# Patient Record
Sex: MALE | Race: ASIAN | URBAN - METROPOLITAN AREA
[De-identification: names, ages, dates, MRNs, and addresses within clinical notes are randomized per-mention and may not be internally consistent; named-entity substitution may affect disease eponyms.]

---

## 2019-04-28 ENCOUNTER — EMERGENCY (EMERGENCY)
Facility: HOSPITAL | Age: 69
LOS: 1 days | Discharge: HOME | End: 2019-04-28
Attending: EMERGENCY MEDICINE | Admitting: EMERGENCY MEDICINE
Payer: COMMERCIAL

## 2019-04-28 VITALS
OXYGEN SATURATION: 98 % | TEMPERATURE: 98 F | RESPIRATION RATE: 18 BRPM | HEART RATE: 78 BPM | DIASTOLIC BLOOD PRESSURE: 114 MMHG | SYSTOLIC BLOOD PRESSURE: 185 MMHG

## 2019-04-28 LAB
ALBUMIN SERPL ELPH-MCNC: 4.3 G/DL — SIGNIFICANT CHANGE UP (ref 3.3–5)
ALP SERPL-CCNC: 83 U/L — SIGNIFICANT CHANGE UP (ref 40–120)
ALT FLD-CCNC: 20 U/L — SIGNIFICANT CHANGE UP (ref 4–41)
ANION GAP SERPL CALC-SCNC: 16 MMO/L — HIGH (ref 7–14)
APTT BLD: 35.8 SEC — SIGNIFICANT CHANGE UP (ref 27.5–36.3)
AST SERPL-CCNC: 18 U/L — SIGNIFICANT CHANGE UP (ref 4–40)
BASE EXCESS BLDV CALC-SCNC: 4.1 MMOL/L — SIGNIFICANT CHANGE UP
BASOPHILS # BLD AUTO: 0.02 K/UL — SIGNIFICANT CHANGE UP (ref 0–0.2)
BASOPHILS NFR BLD AUTO: 0.2 % — SIGNIFICANT CHANGE UP (ref 0–2)
BILIRUB SERPL-MCNC: 1 MG/DL — SIGNIFICANT CHANGE UP (ref 0.2–1.2)
BLD GP AB SCN SERPL QL: NEGATIVE — SIGNIFICANT CHANGE UP
BLOOD GAS VENOUS - CREATININE: 1.54 MG/DL — HIGH (ref 0.5–1.3)
BUN SERPL-MCNC: 20 MG/DL — SIGNIFICANT CHANGE UP (ref 7–23)
CALCIUM SERPL-MCNC: 9.7 MG/DL — SIGNIFICANT CHANGE UP (ref 8.4–10.5)
CHLORIDE BLDV-SCNC: 109 MMOL/L — HIGH (ref 96–108)
CHLORIDE SERPL-SCNC: 104 MMOL/L — SIGNIFICANT CHANGE UP (ref 98–107)
CO2 SERPL-SCNC: 23 MMOL/L — SIGNIFICANT CHANGE UP (ref 22–31)
CREAT SERPL-MCNC: 1.65 MG/DL — HIGH (ref 0.5–1.3)
EOSINOPHIL # BLD AUTO: 0.07 K/UL — SIGNIFICANT CHANGE UP (ref 0–0.5)
EOSINOPHIL NFR BLD AUTO: 0.8 % — SIGNIFICANT CHANGE UP (ref 0–6)
GAS PNL BLDV: 137 MMOL/L — SIGNIFICANT CHANGE UP (ref 136–146)
GLUCOSE BLDV-MCNC: 144 — HIGH (ref 70–99)
GLUCOSE SERPL-MCNC: 147 MG/DL — HIGH (ref 70–99)
HCO3 BLDV-SCNC: 26 MMOL/L — SIGNIFICANT CHANGE UP (ref 20–27)
HCT VFR BLD CALC: 48.5 % — SIGNIFICANT CHANGE UP (ref 39–50)
HCT VFR BLDV CALC: 49.5 % — SIGNIFICANT CHANGE UP (ref 39–51)
HGB BLD-MCNC: 15.8 G/DL — SIGNIFICANT CHANGE UP (ref 13–17)
HGB BLDV-MCNC: 16.2 G/DL — SIGNIFICANT CHANGE UP (ref 13–17)
IMM GRANULOCYTES NFR BLD AUTO: 0.5 % — SIGNIFICANT CHANGE UP (ref 0–1.5)
INR BLD: 0.99 — SIGNIFICANT CHANGE UP (ref 0.88–1.17)
LACTATE BLDV-MCNC: 3.1 MMOL/L — HIGH (ref 0.5–2)
LYMPHOCYTES # BLD AUTO: 2.49 K/UL — SIGNIFICANT CHANGE UP (ref 1–3.3)
LYMPHOCYTES # BLD AUTO: 26.9 % — SIGNIFICANT CHANGE UP (ref 13–44)
MCHC RBC-ENTMCNC: 28.9 PG — SIGNIFICANT CHANGE UP (ref 27–34)
MCHC RBC-ENTMCNC: 32.6 % — SIGNIFICANT CHANGE UP (ref 32–36)
MCV RBC AUTO: 88.8 FL — SIGNIFICANT CHANGE UP (ref 80–100)
MONOCYTES # BLD AUTO: 0.48 K/UL — SIGNIFICANT CHANGE UP (ref 0–0.9)
MONOCYTES NFR BLD AUTO: 5.2 % — SIGNIFICANT CHANGE UP (ref 2–14)
NEUTROPHILS # BLD AUTO: 6.16 K/UL — SIGNIFICANT CHANGE UP (ref 1.8–7.4)
NEUTROPHILS NFR BLD AUTO: 66.4 % — SIGNIFICANT CHANGE UP (ref 43–77)
NRBC # FLD: 0 K/UL — SIGNIFICANT CHANGE UP (ref 0–0)
PCO2 BLDV: 55 MMHG — HIGH (ref 41–51)
PH BLDV: 7.35 PH — SIGNIFICANT CHANGE UP (ref 7.32–7.43)
PLATELET # BLD AUTO: 223 K/UL — SIGNIFICANT CHANGE UP (ref 150–400)
PMV BLD: 10.4 FL — SIGNIFICANT CHANGE UP (ref 7–13)
PO2 BLDV: 39 MMHG — SIGNIFICANT CHANGE UP (ref 35–40)
POTASSIUM BLDV-SCNC: 8.1 MMOL/L — CRITICAL HIGH (ref 3.4–4.5)
POTASSIUM SERPL-MCNC: 4.1 MMOL/L — SIGNIFICANT CHANGE UP (ref 3.5–5.3)
POTASSIUM SERPL-SCNC: 4.1 MMOL/L — SIGNIFICANT CHANGE UP (ref 3.5–5.3)
PROT SERPL-MCNC: 7.8 G/DL — SIGNIFICANT CHANGE UP (ref 6–8.3)
PROTHROM AB SERPL-ACNC: 11.3 SEC — SIGNIFICANT CHANGE UP (ref 9.8–13.1)
RBC # BLD: 5.46 M/UL — SIGNIFICANT CHANGE UP (ref 4.2–5.8)
RBC # FLD: 12.9 % — SIGNIFICANT CHANGE UP (ref 10.3–14.5)
RH IG SCN BLD-IMP: POSITIVE — SIGNIFICANT CHANGE UP
SAO2 % BLDV: 67.5 % — SIGNIFICANT CHANGE UP (ref 60–85)
SODIUM SERPL-SCNC: 143 MMOL/L — SIGNIFICANT CHANGE UP (ref 135–145)
WBC # BLD: 9.27 K/UL — SIGNIFICANT CHANGE UP (ref 3.8–10.5)
WBC # FLD AUTO: 9.27 K/UL — SIGNIFICANT CHANGE UP (ref 3.8–10.5)

## 2019-04-28 PROCEDURE — 99218: CPT | Mod: GC

## 2019-04-28 PROCEDURE — 70544 MR ANGIOGRAPHY HEAD W/O DYE: CPT | Mod: 26,59

## 2019-04-28 PROCEDURE — 70450 CT HEAD/BRAIN W/O DYE: CPT | Mod: 26

## 2019-04-28 PROCEDURE — 70549 MR ANGIOGRAPH NECK W/O&W/DYE: CPT | Mod: 26

## 2019-04-28 PROCEDURE — 70553 MRI BRAIN STEM W/O & W/DYE: CPT | Mod: 26

## 2019-04-28 RX ORDER — AMLODIPINE BESYLATE 2.5 MG/1
5 TABLET ORAL DAILY
Qty: 0 | Refills: 0 | Status: DISCONTINUED | OUTPATIENT
Start: 2019-04-28 | End: 2019-05-02

## 2019-04-28 RX ORDER — SODIUM CHLORIDE 9 MG/ML
500 INJECTION INTRAMUSCULAR; INTRAVENOUS; SUBCUTANEOUS ONCE
Qty: 0 | Refills: 0 | Status: COMPLETED | OUTPATIENT
Start: 2019-04-28 | End: 2019-04-29

## 2019-04-28 RX ORDER — ASPIRIN/CALCIUM CARB/MAGNESIUM 324 MG
81 TABLET ORAL ONCE
Qty: 0 | Refills: 0 | Status: COMPLETED | OUTPATIENT
Start: 2019-04-28 | End: 2019-04-28

## 2019-04-28 RX ORDER — ATORVASTATIN CALCIUM 80 MG/1
80 TABLET, FILM COATED ORAL ONCE
Qty: 0 | Refills: 0 | Status: COMPLETED | OUTPATIENT
Start: 2019-04-28 | End: 2019-04-28

## 2019-04-28 RX ORDER — LABETALOL HCL 100 MG
10 TABLET ORAL ONCE
Qty: 0 | Refills: 0 | Status: COMPLETED | OUTPATIENT
Start: 2019-04-28 | End: 2019-04-28

## 2019-04-28 RX ORDER — SODIUM CHLORIDE 9 MG/ML
1000 INJECTION INTRAMUSCULAR; INTRAVENOUS; SUBCUTANEOUS ONCE
Qty: 0 | Refills: 0 | Status: COMPLETED | OUTPATIENT
Start: 2019-04-28 | End: 2019-04-28

## 2019-04-28 RX ADMIN — AMLODIPINE BESYLATE 5 MILLIGRAM(S): 2.5 TABLET ORAL at 22:41

## 2019-04-28 RX ADMIN — SODIUM CHLORIDE 1000 MILLILITER(S): 9 INJECTION INTRAMUSCULAR; INTRAVENOUS; SUBCUTANEOUS at 19:59

## 2019-04-28 RX ADMIN — Medication 81 MILLIGRAM(S): at 19:59

## 2019-04-28 RX ADMIN — Medication 10 MILLIGRAM(S): at 18:46

## 2019-04-28 RX ADMIN — ATORVASTATIN CALCIUM 80 MILLIGRAM(S): 80 TABLET, FILM COATED ORAL at 19:59

## 2019-04-28 NOTE — ED PROVIDER NOTE - ATTENDING CONTRIBUTION TO CARE
Attending Attestation: Dr. Galvez  I have personally performed a history and physical examination of the patient and discussed management with the resident as well as the patient.  I reviewed the resident's note and agree with the documented findings and plan of care.  I have authored and modified critical sections of the Provider Note, including but not limited to HPI, Physical Exam and MDM. 68M w/ hx of HTN, CKD presents as a code stroke for dizziness.  Though sx started approx 1 wk ago have been stuttering.  Possible CVA? No neuro deficits at present.  Will obtain neuro eval, CTH. Duration of symptoms and low NIHSS preclude tPA (risks > benefits). Pt is also noted to be HTN to 190s systolic which could be contributing to his symptoms. will attempt bp control, send basic labs. will likely need obs for mri (r/o CVA, vert/carotid dissection).  Given elevated Cr ( baseline 1.3) and extended duration of sx will defer CTA for now pending MRI/MRA)

## 2019-04-28 NOTE — ED ADULT NURSE NOTE - OBJECTIVE STATEMENT
68M PMH  of HTN, CKD presents as a code stroke for LUE and LLE paresthesias and generalized weakness. The patient endorses intermittent dizziness and weakness for approx one week, was on meclazine which slightly improved symptoms. Dizziness was worsened by heavy lifting and bending over. Symptoms got better for a few days and then worsened after bending over and standing up today. Denies chest pain, sob, ha, blurry vision.  Pt AOx3, hypertensive upon arrival. + generalized weakness. No focal neuro deficits. Speech clear. Gait steady.

## 2019-04-28 NOTE — CONSULT NOTE ADULT - SUBJECTIVE AND OBJECTIVE BOX
*************************************  NEUROLOGY CONSULT  NOTE  **************************************    ALEXANDER YOUSSEF  Male  MRN-[id number]    HPI:  69 yo man with Hx of HTN, CKD I presents from home with chief complaint of L sided weakness. Symptoms onset/LNW is 2:30 PM today 4/28 while at grocery store. Pt also states he felt weak all over in addition to L symptoms so sat down. Daughter is urology resident at [facility] who is providing collateral. States she observed her at home and denies seeing any facial droop or signs of unilateral weakness. On arrival to ER BP elevated to 198 systolic; appears in NAD. Exam significant for mild diminished sensation to LT on Left side of body otherwise non-focal including romberg's and gait. Per daughter 2 weeks ago pt had complained of dizziness and PCP Dx with vertigo. which was also associated with diaphoresis. Denies prior similar sx or hx of stroke. CTH w/o acute findings. Pt is excluded from tPA after discussing risks vs. benefits with daughter/pt and given mild non deficits who voiced understanding and agreement. NIHSS =1, MRS =0    ROS: Patient denies trauma, LOC, fever, chills, HA, vision changes, tinnitus, dysarthria, dysphagia, dizziness, chest pain, palpitations, SOB, nausea, vomiting, diarrhea, dysuria and incontinence.    PAST MEDICAL & SURGICAL HISTORY:    MEDICATIONS  (STANDING):  labetalol Injectable 10 milliGRAM(s) IV Push once    MEDICATIONS  (PRN):  Allergies  No Known Allergies  Intolerances      VITAL SIGNS:  Vital Signs Last 24 Hrs  T(C): 36.9 (28 Apr 2019 17:38), Max: 36.9 (28 Apr 2019 17:38)  T(F): 98.4 (28 Apr 2019 17:38), Max: 98.4 (28 Apr 2019 17:38)  HR: 78 (28 Apr 2019 17:38) (78 - 78)  BP: 185/114 (28 Apr 2019 17:38) (185/114 - 185/114)  BP(mean): --  RR: 18 (28 Apr 2019 17:38) (18 - 18)  SpO2: 98% (28 Apr 2019 17:38) (98% - 98%)    PHYSICAL EXAMINATION:  General: Well-developed, well nourished, in no acute distress.  Eyes: Conjunctiva and sclera clear.  Neck: Supple, nontender  Skin: no rash, no edema noted  Lung: no respiratory distress noted  Neurologic:  - Mental Status:  Alert, awake, oriented to person, place, and time; Speech is fluent with intact naming, repetition, and comprehension; crosses midline, no extinction or neglect noted  - Cranial Nerves II-XII:  VFF, No nystagmus or APD noted, EOMI, PERRLA, V1-V3 intact, no facial asymmetry, t/p midline, SCM/trap intact.  - Motor:  Strength is 5/5 throughout.  There is no pronator drift.  Normal muscle bulk and tone throughout. No myoclonus or tremor.  - Reflexes:  2+ and symmetric at the biceps, triceps, brachioradialis, knees, and ankles.  Plantar responses flexor.  - Sensory:  diminished to LT on left including lower face  - Coordination:  Finger-nose-finger without dysmetria.  Rapid alternating hand movements intact. No orbiting  - Gait:   Normal steps, base, arm swing, and turning. Tandem gait is normal.  Romberg testing is negative.    LABS:                          15.8   9.27  )-----------( 223      ( 28 Apr 2019 17:57 )             48.5           RADIOLOGY & ADDITIONAL STUDIES:      < from: CT Brain Stroke Protocol (04.28.19 @ 18:02) >  IMPRESSION:    No acute intracranial hemorrhage, mass effect, or CT evidence of an acute   vascular territorialinfarct.    Minimal chronic ischemic changes are seen in the frontoparietal white   matter.    < end of copied text >

## 2019-04-28 NOTE — ED PROVIDER NOTE - OBJECTIVE STATEMENT
68M w/ hx of HTN, CKD 68M w/ hx of HTN, CKD presents as a code stroke for LUE and LLE paresthesias and generalized weakness. The patient endorses intermittent dizziness for approx one week, was on meclazine which slightly improved symptoms. Dizziness was worsened by heavy lifting and bending over. Symptoms got better for a few days and then worsened after bending over and standing up today. Denies chest pain, sob, ha, blurry vision. 68M w/ hx of HTN, CKD presents as a code stroke d/t intermittent dizziness, latest starting approx 1 hr ago. The patient endorses intermittent dizziness for approx one week, was on meclizine via PMD which slightly improved symptoms. Dizziness was worsened by heavy lifting and bending over. Symptoms got better for a few days and then worsened after bending over and standing up today. Denies chest pain, sob, ha, blurry vision.  Also endorses general weakness.  NO fever/chills. Pt's daughter, a urology resident, is at bedside.

## 2019-04-28 NOTE — ED ADULT TRIAGE NOTE - CHIEF COMPLAINT QUOTE
Patient reports left-sided numbness/tingling/weakness since 1430. Patient reports that symptoms resolved after 30 minutes, and then came back. Patient currently has no facial droop, no unilateral weakness, + sensation. MD Galvez performed stroke evaluation. SUZI AGUAYO.

## 2019-04-28 NOTE — CONSULT NOTE ADULT - ASSESSMENT
67 yo man with Hx of HTN, CKD I presents from home with chief complaint of L sided weakness. Symptoms onset/LNW is 2:30 PM today 4/28 while at grocery store. Exam significant for mild left hemisensory deficit.     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  **** Please have pt follow up outpatient with Stroke NP Lorri Liao within 1 week of DC at [facility] [place] located at  [address] [place] [address], [address] [place]; Ph# [phone] and/or email PHI to [email or web address]    - Patient's and daughter's questions and concerns addressed. Pt voiced understanding.    case d/w stroke fellow Dr. Velasco    neuro spectra [id number] (please inform if d/cing) 69 yo man with Hx of HTN, CKD I presents from home with chief complaint of L sided weakness. Symptoms onset/LNW is 2:30 PM today 4/28 while at grocery store. Exam significant for mild left hemisensory deficit.     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   []  ASA81/Hdummr82 for 3 weeks followed by ASA 81 alone per CHANCE trial  []  Atorvastatin 80, titrate to LDL<70  []  MRI brain w/o, TTE (maybe done outpatient if being discharged)    []  Please send HbA1C and Lipid Panel  []  Telemonitoring;  Neurochecks per unit protocol  []  NPO until clears Dysphagia screen, otherwise Swallow evaluation  []  Stroke education provided    Once optimized from ED POV, pt should follow up with stroke neurology outpt.  **** Please have pt follow up outpatient with Stroke NP Lorri Liao within 1 week of DC at [facility] [place] located at  [address] [place] [address], [address] [place]; Ph# [phone] and/or email PHI to [email or web address]    - Patient's and daughter's questions and concerns addressed. Pt voiced understanding.    case d/w stroke fellow Dr. Velasco    neuro spectra [id number] (please inform if d/cing) 67 yo man with Hx of HTN, CKD I presents from home with chief complaint of L sided weakness. Symptoms onset/LNW is 2:30 PM today 4/28 while at grocery store. Exam significant for mild left hemisensory deficit.     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   []  ASA81/Smgsmi16 for 3 weeks followed by ASA 81 alone per CHANCE trial  []  Atorvastatin 80, titrate to LDL<70  []  MRI brain w/o, TTE (maybe done outpatient if being discharged)    []  Please send HbA1C and Lipid Panel  []  Telemonitoring;  Neurochecks per unit protocol  []  NPO until clears Dysphagia screen, otherwise Swallow evaluation  []  Stroke education provided    Once optimized from ED POV, pt should follow up with stroke neurology outpt.    **** Please have pt follow up outpatient with Stroke NP Lorri Liao within 1 week of DC at [facility] [place] located at  [address] [place] [address], [address] [place]; Ph# [phone] and/or email PHI to [email or web address]    - Patient's and daughter's questions and concerns addressed. Pt voiced understanding.    case d/w stroke fellow Dr. Velasco    neuro spectra [id number] (please inform if d/cing) 67 yo man with Hx of HTN, CKD I presents from home with chief complaint of L sided weakness. Symptoms onset/LNW is 2:30 PM today 4/28 while at grocery store. Exam significant for mild left hemisensory deficit. Daughter also later reported mild vague R neck pain that pt has been endorsing for several days.    *** 7PM interval re-assessment: pt reports complete resolution of his left sided paresthesia reports feeling generalized weakness only.    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    **** Please have pt follow up outpatient with Stroke NP Lorri Liao within 1 week of DC at [facility] [place] located at  [address] [place] [address], [address] [place]; Ph# [phone] and/or email PHI to [email or web address]    - Patient's and daughter's questions and concerns addressed f/u numbers provided. Pt voiced understanding.    case d/w stroke fellow Dr. Velasco    neuro spectra [id number] (please inform if d/cing)

## 2019-04-28 NOTE — ED PROVIDER NOTE - PHYSICAL EXAMINATION
General: WN/WD NAD  HEENT: PERRLA, EOMI, moist mucous membranes  Neurology: A&Ox3, decreased sensation in the LUE and LLE, strength in tact, WEBSTER x 4, rhomberg neg. gait wnl, no pronator drift  Respiratory: CTA B/L, normal respiratory effort, no wheezes, crackles, rales  CV: RRR, S1S2, no murmurs, rubs or gallops  Abdominal: Soft, NT, ND +BS  Extremities: No edema, + peripheral pulses  Incisions: none  Tubes: piv

## 2019-04-28 NOTE — ED PROVIDER NOTE - NS ED ROS FT
REVIEW OF SYSTEMS:    Constitutional:     [ ] negative [ -] fevers [ -] chills [ ] weight loss [ ] weight gain  HEENT:                  [ ] negative [ ] dry eyes [ ] eye irritation [ ] postnasal drip [ ] nasal congestion  CV:                         [ ] negative  [ -] chest pain [ ] orthopnea [ ] palpitations [ ] murmur  Resp:                     [ ] negative [- ] cough [ ] shortness of breath [ ] dyspnea [ ] wheezing [ ] sputum [ ] hemoptysis  GI:                          [ ] negative [- ] nausea [ -] vomiting [ ] diarrhea [ ] constipation [ ] abd pain [ ] dysphagia   :                        [ ] negative [- ] dysuria [ ] nocturia [ ] hematuria [ ] increased urinary frequency  Musculoskeletal: [ ] negative [ -] back pain [ ] myalgias [ ] arthralgias [ ] fracture  Skin:                       [ ] negative [ -] rash [ ] itch  Neurological:        [ ] negative [ ] headache [ ] dizziness [ ] syncope [ ] weakness [ ] numbness  Psychiatric:           [ ] negative [ -] anxiety [ ] depression  Endocrine:            [ ] negative [-] diabetes [ ] thyroid problem  Heme/Lymph:      [ ] negative [ ] anemia [ ] bleeding problem  Allergic/Immune: [ ] negative [ ] itchy eyes [ ] nasal discharge [ ] hives [ ] angioedema    [x ] All other systems negative  [ ] Unable to assess ROS because ________.

## 2019-04-28 NOTE — ED PROVIDER NOTE - CLINICAL SUMMARY MEDICAL DECISION MAKING FREE TEXT BOX
68M w/ hx of HTN, CKD presents as a code stroke for LUE and LLE paresthesias and generalized weakness. Duration of symptoms precludes tPA. Pt is also noted to be HTN to 190s systolic which could be contributing to his sypmtoms. will attempt bp control, send basic labs. will likely need obs for mri. 68M w/ hx of HTN, CKD presents as a code stroke for dizziness.  Though sx started approx 1 wk ago have been stuttering.  Possible CVA? No neuro deficits at present.  Will obtain neuro eval, CTH. Duration of symptoms and low NIHSS preclude tPA (risks > benefits). Pt is also noted to be HTN to 190s systolic which could be contributing to his symptoms. will attempt bp control, send basic labs. will likely need obs for mri (r/o CVA, vert/carotid dissection).  Given elevated Cr ( baseline 1.3) and extended duration of sx will defer CTA for now pending MRI/MRA)

## 2019-04-28 NOTE — ED CDU PROVIDER INITIAL DAY NOTE - MEDICAL DECISION MAKING DETAILS
A:  -Dizziness  P:  -MR Head, MR Angio Neck Pt p/w dizziness, intermittent x 5 days, worse today.  Code stroke in ED.  To CDU for MRI/MRA to assess CVA and also vert/carotid dissxn. Currently feeling somewhat better.  BP controlled.

## 2019-04-28 NOTE — ED CDU PROVIDER INITIAL DAY NOTE - ATTENDING CONTRIBUTION TO CARE
ED Attending (Dr Galvez): I have personally performed a face to face bedside history and physical examination of this patient.  I have discussed the case with the PA and  I have personally authored the following components: HPI, ROS, Physical Exam and MDM in addition to reviewing and revising the rest of the Provider Note. Pt p/w dizziness, intermittent x 5 days, worse today.  Code stroke in ED.  To CDU for MRI/MRA to assess CVA and also vert/carotid dissxn. Currently feeling somewhat better.  BP controlled.

## 2019-04-29 VITALS
TEMPERATURE: 97 F | HEART RATE: 58 BPM | RESPIRATION RATE: 17 BRPM | DIASTOLIC BLOOD PRESSURE: 99 MMHG | OXYGEN SATURATION: 100 % | SYSTOLIC BLOOD PRESSURE: 146 MMHG

## 2019-04-29 LAB
ALBUMIN SERPL ELPH-MCNC: 3.7 G/DL — SIGNIFICANT CHANGE UP (ref 3.3–5)
ALP SERPL-CCNC: 67 U/L — SIGNIFICANT CHANGE UP (ref 40–120)
ALT FLD-CCNC: 14 U/L — SIGNIFICANT CHANGE UP (ref 4–41)
ANION GAP SERPL CALC-SCNC: 11 MMO/L — SIGNIFICANT CHANGE UP (ref 7–14)
AST SERPL-CCNC: 15 U/L — SIGNIFICANT CHANGE UP (ref 4–40)
BASE EXCESS BLDV CALC-SCNC: 3 MMOL/L — SIGNIFICANT CHANGE UP
BILIRUB SERPL-MCNC: 1.3 MG/DL — HIGH (ref 0.2–1.2)
BLOOD GAS VENOUS - CREATININE: 1.21 MG/DL — SIGNIFICANT CHANGE UP (ref 0.5–1.3)
BUN SERPL-MCNC: 16 MG/DL — SIGNIFICANT CHANGE UP (ref 7–23)
CALCIUM SERPL-MCNC: 8.7 MG/DL — SIGNIFICANT CHANGE UP (ref 8.4–10.5)
CHLORIDE BLDV-SCNC: 113 MMOL/L — HIGH (ref 96–108)
CHLORIDE SERPL-SCNC: 110 MMOL/L — HIGH (ref 98–107)
CHOLEST SERPL-MCNC: 163 MG/DL — SIGNIFICANT CHANGE UP (ref 120–199)
CO2 SERPL-SCNC: 25 MMOL/L — SIGNIFICANT CHANGE UP (ref 22–31)
CREAT SERPL-MCNC: 1.34 MG/DL — HIGH (ref 0.5–1.3)
GAS PNL BLDV: 141 MMOL/L — SIGNIFICANT CHANGE UP (ref 136–146)
GLUCOSE BLDV-MCNC: 96 — SIGNIFICANT CHANGE UP (ref 70–99)
GLUCOSE SERPL-MCNC: 101 MG/DL — HIGH (ref 70–99)
HCO3 BLDV-SCNC: 26 MMOL/L — SIGNIFICANT CHANGE UP (ref 20–27)
HCT VFR BLDV CALC: 45.4 % — SIGNIFICANT CHANGE UP (ref 39–51)
HDLC SERPL-MCNC: 46 MG/DL — SIGNIFICANT CHANGE UP (ref 35–55)
HGB BLDV-MCNC: 14.8 G/DL — SIGNIFICANT CHANGE UP (ref 13–17)
LACTATE BLDV-MCNC: 1.9 MMOL/L — SIGNIFICANT CHANGE UP (ref 0.5–2)
LIPID PNL WITH DIRECT LDL SERPL: 111 MG/DL — SIGNIFICANT CHANGE UP
PCO2 BLDV: 47 MMHG — SIGNIFICANT CHANGE UP (ref 41–51)
PH BLDV: 7.39 PH — SIGNIFICANT CHANGE UP (ref 7.32–7.43)
PO2 BLDV: 53 MMHG — HIGH (ref 35–40)
POTASSIUM BLDV-SCNC: 3.6 MMOL/L — SIGNIFICANT CHANGE UP (ref 3.4–4.5)
POTASSIUM SERPL-MCNC: 4 MMOL/L — SIGNIFICANT CHANGE UP (ref 3.5–5.3)
POTASSIUM SERPL-SCNC: 4 MMOL/L — SIGNIFICANT CHANGE UP (ref 3.5–5.3)
PROT SERPL-MCNC: 6.4 G/DL — SIGNIFICANT CHANGE UP (ref 6–8.3)
SAO2 % BLDV: 84.9 % — SIGNIFICANT CHANGE UP (ref 60–85)
SODIUM SERPL-SCNC: 146 MMOL/L — HIGH (ref 135–145)
TRIGL SERPL-MCNC: 137 MG/DL — SIGNIFICANT CHANGE UP (ref 10–149)

## 2019-04-29 PROCEDURE — 99217: CPT | Mod: GC

## 2019-04-29 RX ADMIN — SODIUM CHLORIDE 500 MILLILITER(S): 9 INJECTION INTRAMUSCULAR; INTRAVENOUS; SUBCUTANEOUS at 02:57

## 2019-04-29 NOTE — ED CDU PROVIDER SUBSEQUENT DAY NOTE - HISTORY
68M w/ hx of HTN, CKD presents as a code stroke d/t intermittent dizziness, latest starting approx 1 hr ago. The patient endorses intermittent dizziness for approx one week, was on meclizine via PMD which slightly improved symptoms. Dizziness was worsened by heavy lifting and bending over. Symptoms got better for a few days and then worsened after bending over and standing up today. Denies chest pain, sob, ha, blurry vision.  Also endorses general weakness.  NO fever/chills. Pt's daughter, a urology resident, is at bedside.  Pt sent to CDU d/t concern for Sx related to subacute CVA vs HTN.  Also, CTA deferred d/t acute on chronic renal insufficiency. MRI/MRA pending - r/o CVA, vert/carotid dissection (pt c neck pain). Neuro f/u.     CDU Subsequent Day ALEJANDRO [place]: Agree with above, 67 Y/O M PMH CKD HTN 68M w/ hx of HTN, CKD presents as a code stroke d/t intermittent dizziness, latest starting approx 1 hr ago. The patient endorses intermittent dizziness for approx one week, was on meclizine via PMD which slightly improved symptoms. Dizziness was worsened by heavy lifting and bending over. Symptoms got better for a few days and then worsened after bending over and standing up today. Denies chest pain, sob, ha, blurry vision.  Also endorses general weakness.  NO fever/chills. Pt's daughter, a urology resident, is at bedside.  Pt sent to CDU d/t concern for Sx related to subacute CVA vs HTN.  Also, CTA deferred d/t acute on chronic renal insufficiency. MRI/MRA pending - r/o CVA, vert/carotid dissection (pt c neck pain). Neuro f/u.     CDU Subsequent Day ALEJANDRO [place]: Agree with above, 67 Y/O M PMH CKD HTN presented with room spinning dizz. Pt felt better while in ED, was sent to CDU for MRI/MRA. Pt reports felling better, no other acute symptoms, will D/C with primary MD and neurology follow up.

## 2019-04-29 NOTE — ED CDU PROVIDER SUBSEQUENT DAY NOTE - MEDICAL DECISION MAKING DETAILS
Kevin: Feeling well. No additional Sx overnight. Awaiting MRI and renal artery US (for HTN and elevated Cr).

## 2019-04-29 NOTE — ED CDU PROVIDER SUBSEQUENT DAY NOTE - CROS ED ROS STATEMENT
"Chief Complaint   Patient presents with     Hospital F/U     ER 09/02/ and hospital 09/06/17-09/09/17     Health Maintenance     patient wants to discuss Oct 12 appointment doesn't want to go to the [facility].  Will wait on any shots today due to illness.     Headache     10 days started on left side and now right side also and thinks that is why he is dizzy.       Pain     neck and shoulders sore and painful for over 2 weeks.  Affecting sleeping.        Initial /67  Pulse 78  Temp 98.5  F (36.9  C) (Tympanic)  Ht 5' 10\" (1.778 m)  Wt 204 lb (92.5 kg)  SpO2 98%  BMI 29.27 kg/m2 Estimated body mass index is 29.27 kg/(m^2) as calculated from the following:    Height as of this encounter: 5' 10\" (1.778 m).    Weight as of this encounter: 204 lb (92.5 kg).  Medication Reconciliation: complete    " all other ROS negative except as per HPI

## 2019-04-29 NOTE — ED CDU PROVIDER DISPOSITION NOTE - ATTENDING CONTRIBUTION TO CARE
I performed a face-to-face evaluation of the patient and performed a history and physical examination. I agree with the history and physical examination.    Kevin: Admitted to CDU for dizziness, worse when leaning over. Not change with position change. Neuro exam unremarkable. Seen by Neuro. Initial elevated Cr, improved w/ IVF; renal US neg. Labs unremarkable. MRI unremarkable. Dc home.

## 2019-04-29 NOTE — ED CDU PROVIDER SUBSEQUENT DAY NOTE - ATTENDING CONTRIBUTION TO CARE
I performed a face-to-face evaluation of the patient and performed a history and physical examination. I agree with the history and physical examination.    Kevin: Feeling well. No additional Sx overnight. Awaiting MRI and renal artery US (for HTN and elevated Cr).

## 2019-04-29 NOTE — ED CDU PROVIDER SUBSEQUENT DAY NOTE - PROGRESS NOTE DETAILS
Patient resting comfortably in bed 2 in NAD, VSS, pt hypertensive; however, w/ no neuro deficits, no HA or dizziness presently, given Amlodipine 5mg, will recheck VS and give Labetalol 10mgs IVP if persistently elevated. Will continue to obs, pending MRI result. Pt reports feeling better, has no acute sx, no residual deficits.

## 2019-04-29 NOTE — ED CDU PROVIDER DISPOSITION NOTE - CLINICAL COURSE
Kevin: Admitted to CDU for dizziness, worse when leaning over. Not change with position change. Neuro exam unremarkable. Seen by Neuro. Initial elevated Cr, improved w/ IVF; renal US neg. Labs unremarkable. MRI unremarkable. Dc home.

## 2019-04-29 NOTE — ED CDU PROVIDER DISPOSITION NOTE - NSFOLLOWUPINSTRUCTIONS_ED_ALL_ED_FT
Follow up with your primary doctor and a neurologist. If you do not have a neurologist call  to schedule an appointment. Bring copies of your results which are included in this chart. Advance activity as tolerated.  Continue all previously prescribed medications as directed.  Follow up with your primary care physician in 48-72 hours- bring copies of your results.  Return to the ER for worsening or persistent symptoms, and/or ANY NEW OR CONCERNING SYMPTOMS. If you have issues obtaining follow up, please call: [phone] to obtain a doctor or specialist who takes your insurance in your area.  You may call [phone] to make an appointment with the internal medicine clinic.

## 2019-04-30 ENCOUNTER — APPOINTMENT (OUTPATIENT)
Dept: NEUROLOGY | Facility: CLINIC | Age: 69
End: 2019-04-30
Payer: MEDICARE

## 2019-04-30 VITALS
HEIGHT: 66 IN | HEART RATE: 62 BPM | BODY MASS INDEX: 28.12 KG/M2 | WEIGHT: 175 LBS | DIASTOLIC BLOOD PRESSURE: 93 MMHG | SYSTOLIC BLOOD PRESSURE: 160 MMHG

## 2019-04-30 DIAGNOSIS — G45.9 TRANSIENT CEREBRAL ISCHEMIC ATTACK, UNSPECIFIED: ICD-10-CM

## 2019-04-30 DIAGNOSIS — Z87.891 PERSONAL HISTORY OF NICOTINE DEPENDENCE: ICD-10-CM

## 2019-04-30 DIAGNOSIS — R06.83 SNORING: ICD-10-CM

## 2019-04-30 PROBLEM — N18.9 CHRONIC KIDNEY DISEASE, UNSPECIFIED: Chronic | Status: ACTIVE | Noted: 2019-04-28

## 2019-04-30 PROBLEM — I10 ESSENTIAL (PRIMARY) HYPERTENSION: Chronic | Status: ACTIVE | Noted: 2019-04-28

## 2019-04-30 PROCEDURE — 99496 TRANSJ CARE MGMT HIGH F2F 7D: CPT

## 2019-04-30 PROCEDURE — 99214 OFFICE O/P EST MOD 30 MIN: CPT

## 2019-05-03 ENCOUNTER — EMERGENCY (EMERGENCY)
Facility: HOSPITAL | Age: 69
LOS: 1 days | Discharge: HOME | End: 2019-05-03
Attending: EMERGENCY MEDICINE | Admitting: EMERGENCY MEDICINE
Payer: COMMERCIAL

## 2019-05-03 VITALS
HEART RATE: 84 BPM | RESPIRATION RATE: 16 BRPM | SYSTOLIC BLOOD PRESSURE: 160 MMHG | OXYGEN SATURATION: 98 % | DIASTOLIC BLOOD PRESSURE: 92 MMHG | TEMPERATURE: 98 F

## 2019-05-03 PROCEDURE — 99284 EMERGENCY DEPT VISIT MOD MDM: CPT | Mod: 25,GC

## 2019-05-03 PROCEDURE — 93010 ELECTROCARDIOGRAM REPORT: CPT | Mod: 59

## 2019-05-03 NOTE — ED ADULT TRIAGE NOTE - CHIEF COMPLAINT QUOTE
pt BIBEMS c/o High BP earlier today (204/103) w c/o of dizziness and nausea. pt now states he has a HA and nausea is persistent. denies CP/SOB.

## 2019-05-03 NOTE — ED PROVIDER NOTE - CLINICAL SUMMARY MEDICAL DECISION MAKING FREE TEXT BOX
69 y/o male with a PMHx of HTN presents to the ER c/o x 1 day of dizziness with associated nausea, pt recently admitted to CDU for similar symptoms, pt is well appearing, NAD, will check labs, EKG, CXR, follow up PMD/Cardiology.

## 2019-05-03 NOTE — ED PROVIDER NOTE - OBJECTIVE STATEMENT
67 y/o male with a PMHx of HTN presents to the ED with c/o x 1 day of dizziness with associated sx of nausea. Pt states was at home when he felt nausea and dizziness and took his blood pressure and reports 204/103 prompting him to call EMS. Pt was seen and evaluated in the ER/CDU x 4 days ago for the same Sx. Pt is doing f/u outpatient at cardiology Dr. Reginaldo Koenig, pt currently has Holter monitor to the  chest wall place by cardiologist. Pt denies HA, CP, SOB, papulation, abd pain, or back pain. of note Pt recently had amlodipine for 5 mg to 10 mg by Dr. Koenig 69 y/o male with a PMHx of HTN presents to the ER c/o x 1 day of dizziness with associated nausea. Pt states he was at home when he felt nausea and dizziness; pt took his blood pressure and reports a reading of 204/103 prompting him to call EMS. Pt was seen and evaluated in the ER/CDU 4 days ago for the same Sx. Pt is being followed by cardiology Dr. Reginaldo Koenig; pt currently has Holter monitor to the  chest wall place by cardiologist. Pt denies Headache, Chest Pain, SOB, palpitations, abdominal pain, back pain. Pt recently had his Amlodipine increased from 5 mg to 10 mg by Dr. Koenig

## 2019-05-03 NOTE — ED PROVIDER NOTE - PROGRESS NOTE DETAILS
ALEJANDRO Chiu: will place pt in CDU for cardiac monitoring, repeat troponin, BP monitoring.  Spoke with Dr. Koenig does not want pt admitted to his service advised could start Tenormin 25mg daily with follow up in the office next week.  Will hold off on starting Tenormin for now pending further work up.

## 2019-05-03 NOTE — ED PROVIDER NOTE - ATTENDING CONTRIBUTION TO CARE
pt here with episode of nausea  mild HA asssoc with BP elevation  Recently in CDU for neuro evaluation  Pt notes BP recently more difficult to control w/o change in diet , ETOH consumption or OTC medication use  amlodipine recently increased   Pt does not note postural sxs  diaphoresis tremors or wt loss      exam  pt appears in no distress clear lungs  card RRR S12S2  no gallop  abd nontender  neuro nl strength no drift  nl F-N-F  cn nl no nystagmus  EKG  no acute changes    trop #1 9  repeat BP here systolic 150's    Plan  obatain 3 hour trop  monitor overnight for recurrent sxs  monitor BP

## 2019-05-04 ENCOUNTER — TRANSCRIPTION ENCOUNTER (OUTPATIENT)
Age: 69
End: 2019-05-04

## 2019-05-04 LAB
ALBUMIN SERPL ELPH-MCNC: 4.6 G/DL — SIGNIFICANT CHANGE UP (ref 3.3–5)
ALP SERPL-CCNC: 87 U/L — SIGNIFICANT CHANGE UP (ref 40–120)
ALT FLD-CCNC: 16 U/L — SIGNIFICANT CHANGE UP (ref 4–41)
ANION GAP SERPL CALC-SCNC: 18 MMO/L — HIGH (ref 7–14)
AST SERPL-CCNC: 17 U/L — SIGNIFICANT CHANGE UP (ref 4–40)
BASOPHILS # BLD AUTO: 0.03 K/UL — SIGNIFICANT CHANGE UP (ref 0–0.2)
BASOPHILS NFR BLD AUTO: 0.3 % — SIGNIFICANT CHANGE UP (ref 0–2)
BILIRUB SERPL-MCNC: 1.3 MG/DL — HIGH (ref 0.2–1.2)
BUN SERPL-MCNC: 24 MG/DL — HIGH (ref 7–23)
CALCIUM SERPL-MCNC: 9.4 MG/DL — SIGNIFICANT CHANGE UP (ref 8.4–10.5)
CHLORIDE SERPL-SCNC: 103 MMOL/L — SIGNIFICANT CHANGE UP (ref 98–107)
CO2 SERPL-SCNC: 21 MMOL/L — LOW (ref 22–31)
CORTIS SERPL-MCNC: 2.6 UG/DL — LOW (ref 2.7–18.4)
CREAT SERPL-MCNC: 1.29 MG/DL — SIGNIFICANT CHANGE UP (ref 0.5–1.3)
EOSINOPHIL # BLD AUTO: 0.03 K/UL — SIGNIFICANT CHANGE UP (ref 0–0.5)
EOSINOPHIL NFR BLD AUTO: 0.3 % — SIGNIFICANT CHANGE UP (ref 0–6)
GLUCOSE SERPL-MCNC: 106 MG/DL — HIGH (ref 70–99)
HBA1C BLD-MCNC: 5.5 % — SIGNIFICANT CHANGE UP (ref 4–5.6)
HCT VFR BLD CALC: 50.7 % — HIGH (ref 39–50)
HGB BLD-MCNC: 16.9 G/DL — SIGNIFICANT CHANGE UP (ref 13–17)
IMM GRANULOCYTES NFR BLD AUTO: 0.4 % — SIGNIFICANT CHANGE UP (ref 0–1.5)
INR BLD: 1.01 — SIGNIFICANT CHANGE UP (ref 0.88–1.17)
LYMPHOCYTES # BLD AUTO: 2.38 K/UL — SIGNIFICANT CHANGE UP (ref 1–3.3)
LYMPHOCYTES # BLD AUTO: 22.7 % — SIGNIFICANT CHANGE UP (ref 13–44)
MCHC RBC-ENTMCNC: 28.9 PG — SIGNIFICANT CHANGE UP (ref 27–34)
MCHC RBC-ENTMCNC: 33.3 % — SIGNIFICANT CHANGE UP (ref 32–36)
MCV RBC AUTO: 86.8 FL — SIGNIFICANT CHANGE UP (ref 80–100)
MONOCYTES # BLD AUTO: 0.45 K/UL — SIGNIFICANT CHANGE UP (ref 0–0.9)
MONOCYTES NFR BLD AUTO: 4.3 % — SIGNIFICANT CHANGE UP (ref 2–14)
NEUTROPHILS # BLD AUTO: 7.56 K/UL — HIGH (ref 1.8–7.4)
NEUTROPHILS NFR BLD AUTO: 72 % — SIGNIFICANT CHANGE UP (ref 43–77)
NRBC # FLD: 0 K/UL — SIGNIFICANT CHANGE UP (ref 0–0)
PLATELET # BLD AUTO: 239 K/UL — SIGNIFICANT CHANGE UP (ref 150–400)
PMV BLD: 10.4 FL — SIGNIFICANT CHANGE UP (ref 7–13)
POTASSIUM SERPL-MCNC: 3.9 MMOL/L — SIGNIFICANT CHANGE UP (ref 3.5–5.3)
POTASSIUM SERPL-SCNC: 3.9 MMOL/L — SIGNIFICANT CHANGE UP (ref 3.5–5.3)
PROT SERPL-MCNC: 8.1 G/DL — SIGNIFICANT CHANGE UP (ref 6–8.3)
PROTHROM AB SERPL-ACNC: 11.5 SEC — SIGNIFICANT CHANGE UP (ref 9.8–13.1)
RBC # BLD: 5.84 M/UL — HIGH (ref 4.2–5.8)
RBC # FLD: 12.7 % — SIGNIFICANT CHANGE UP (ref 10.3–14.5)
SODIUM SERPL-SCNC: 142 MMOL/L — SIGNIFICANT CHANGE UP (ref 135–145)
T3 SERPL-MCNC: 90.1 NG/DL — SIGNIFICANT CHANGE UP (ref 80–200)
T4 AB SER-ACNC: 6.4 UG/DL — SIGNIFICANT CHANGE UP (ref 5.1–13)
TROPONIN T, HIGH SENSITIVITY: 9 NG/L — SIGNIFICANT CHANGE UP (ref ?–14)
TROPONIN T, HIGH SENSITIVITY: 9 NG/L — SIGNIFICANT CHANGE UP (ref ?–14)
TSH SERPL-MCNC: 3.03 UIU/ML — SIGNIFICANT CHANGE UP (ref 0.27–4.2)
WBC # BLD: 10.49 K/UL — SIGNIFICANT CHANGE UP (ref 3.8–10.5)
WBC # FLD AUTO: 10.49 K/UL — SIGNIFICANT CHANGE UP (ref 3.8–10.5)

## 2019-05-04 PROCEDURE — 71046 X-RAY EXAM CHEST 2 VIEWS: CPT | Mod: 26

## 2019-05-04 PROCEDURE — 99218: CPT

## 2019-05-04 RX ORDER — ASPIRIN/CALCIUM CARB/MAGNESIUM 324 MG
81 TABLET ORAL DAILY
Qty: 0 | Refills: 0 | Status: DISCONTINUED | OUTPATIENT
Start: 2019-05-04 | End: 2019-05-07

## 2019-05-04 RX ORDER — AMLODIPINE BESYLATE 2.5 MG/1
10 TABLET ORAL DAILY
Qty: 0 | Refills: 0 | Status: DISCONTINUED | OUTPATIENT
Start: 2019-05-04 | End: 2019-05-07

## 2019-05-04 RX ORDER — ATORVASTATIN CALCIUM 80 MG/1
40 TABLET, FILM COATED ORAL AT BEDTIME
Qty: 0 | Refills: 0 | Status: DISCONTINUED | OUTPATIENT
Start: 2019-05-04 | End: 2019-05-07

## 2019-05-04 RX ADMIN — Medication 81 MILLIGRAM(S): at 12:30

## 2019-05-04 RX ADMIN — AMLODIPINE BESYLATE 10 MILLIGRAM(S): 2.5 TABLET ORAL at 18:58

## 2019-05-04 RX ADMIN — ATORVASTATIN CALCIUM 40 MILLIGRAM(S): 80 TABLET, FILM COATED ORAL at 21:01

## 2019-05-04 NOTE — ED CDU PROVIDER INITIAL DAY NOTE - ATTENDING CONTRIBUTION TO CARE
Tomi  pt presenting to ED with c/o dizziness nausea  elevated BP  He and family have noted BP difficult to control lately  though pt taking meds and has not begun any OTC cold meds  excessive NSAID or ETOH use  Pt denies postural dizziness  CP palpitations and SOB    exam  here pt with clear lungs  card RRR S1S2  no significant murmur  benign abd w/o audible bruit  neuro  CN  nl  nl strength sensation and cerebellar    Plan  place in CDU  monitor for recurrent sxs  monitor BP  discuss  BP med modification with PMD

## 2019-05-04 NOTE — ED CDU PROVIDER INITIAL DAY NOTE - MEDICAL DECISION MAKING DETAILS
Dizziness/nausea, Eleavted BP-  Ng MRI/MRA , Renal duples from this past week, BP meds recently inc by his caridologist.  WIll monitor on tele , rpt trop , monitor vitals ttsh, orthostatics in AM, and observe for recurrent sx.  ED team sw his cardiologist, Dr Mar who said he can fu out pt in his office this week ? add Tenormin at [place]. Dizziness/nausea, Eleavted BP-  Ng MRI/MRA , Renal duples , echo, from this past week, BP meds recently inc by his caridologist.  WIll monitor on tele , rpt trop , monitor vitals ttsh, orthostatics in AM, and observe for recurrent sx.  ED team sw his cardiologist, Dr Mar who said he can fu out pt in his office this week ? add Tenormin at [place].

## 2019-05-04 NOTE — ED ADULT NURSE NOTE - NSIMPLEMENTINTERV_GEN_ALL_ED
Implemented All Fall Risk Interventions:  [place] to call system. Call bell, personal items and telephone within reach. Instruct patient to call for assistance. Room bathroom lighting operational. Non-slip footwear when patient is off stretcher. Physically safe environment: no spills, clutter or unnecessary equipment. Stretcher in lowest position, wheels locked, appropriate side rails in place. Provide visual cue, wrist band, yellow gown, etc. Monitor gait and stability. Monitor for mental status changes and reorient to person, place, and time. Review medications for side effects contributing to fall risk. Reinforce activity limits and safety measures with patient and family.

## 2019-05-04 NOTE — ED CDU PROVIDER SUBSEQUENT DAY NOTE - PROGRESS NOTE DETAILS
BP stable, no recurrent sx. No events on tele. 2nd trop stable and tsh ok.   Rested comfortably thru the night. Will sign out shortly to day team attending and PA BP stable, no recurrent sx. No events on tele. 2nd trop stable and tsh ok. Not orthostatic  Rested comfortably thru the night. Will sign out shortly to day team attending and PA

## 2019-05-04 NOTE — ED CDU PROVIDER SUBSEQUENT DAY NOTE - HISTORY
69 y/o male with a PMHx of HTN presents to the ER c/o x 1 day of dizziness with associated nausea. Pt states he was at home when he felt nausea and dizziness this evening; pt took his blood pressure and reports a reading of 204/103 prompting him to call EMS. Pt was seen and evaluated in the ER/CDU 4 days ago for the same Sx, Ng MRI/MRA.  Had Duplex abd/pelvis 2 days ago with his cardio, limited 2/2 bowel gas but ow ok.   Pt is being followed by cardiology Dr. Reginaldo Koenig; pt currently has Holter monitor to the  chest wall place by cardiologist. Pt denies Headache, Chest Pain, SOB, palpitations, abdominal pain, back pain. Pt recently had his Amlodipine increased from 5 mg to 10 mg by Dr. Koenig 2 days ago.    Pressures have improved in ED.  Sent to CDU for 2nd trop/tele monitoring., and to observe for recurrent sx.  Vitals stable and Resting comfortably at this time.

## 2019-05-04 NOTE — ED CDU PROVIDER INITIAL DAY NOTE - PROGRESS NOTE DETAILS
CDU Att PN: Lenny  Pt feeling well.  NO further dizziness. This pt is known to me from prior ED visit and CDU stay. At that time MRI's and Renal Duplex negative. Pt and daughter concerned about recurrent dizziness and abd discomfort as well as labile BP. During prior admit and today also asking about pheo. NO family h/o this condition or endocrine tumors, but pt sx may be c/w mild pheo.  Will obtain 24 hr urine for metanephrines. Also, thyroid studies. Reassess. Understands he will be staying until tomorrow for urine collection.   I have personally performed a face to face evaluation of this patient including review of the history and focused physical exam.  I have discussed the case and reviewed the plan of care with the PA. Agree with above, pt and family concerned about intermittent labile BP, will stay in CDU for 24 hour urine collection and continued monitoring. Repeat renal duplex performed due to poor quality of prior study. Pt reassessed; reports feeling better; denies any symptoms at this time. A&O3, in no apparent acute distress, resting well. will continue to monitor overnight.

## 2019-05-04 NOTE — ED CDU PROVIDER INITIAL DAY NOTE - OBJECTIVE STATEMENT
67 y/o male with a PMHx of HTN presents to the ER c/o x 1 day of dizziness with associated nausea. Pt states he was at home when he felt nausea and dizziness this evening; pt took his blood pressure and reports a reading of 204/103 prompting him to call EMS. Pt was seen and evaluated in the ER/CDU 4 days ago for the same Sx, Ng MRI/MRA.  Had Duplex abd/pelvis 2 days ago with his cardio, limited 2/2 bowel gas but ow ok.   Pt is being followed by cardiology Dr. Reginaldo Koenig; pt currently has Holter monitor to the  chest wall place by cardiologist. Pt denies Headache, Chest Pain, SOB, palpitations, abdominal pain, back pain. Pt recently had his Amlodipine increased from 5 mg to 10 mg by Dr. Koenig 2 days ago.    Pressures have improved in ED.  Sent to CDU for 2nd trop/tele monitoring., and to observe for recurrent sx.  Vitals stable and Resting comfortably at this time. 67 y/o male with a PMHx of HTN presents to the ER c/o x 1 day of dizziness with associated nausea. Pt states he was at home when he felt nausea and dizziness this evening; pt took his blood pressure and reports a reading of 204/103 prompting him to call EMS. Pt was seen and evaluated in the ER/CDU 4 days ago for the same Sx, Ng MRI/MRA.  Had Duplex abd/pelvis 2 days ago with his cardio, lmited 2/2 bowel gas but ow ok, and an echo which was ok .    Pt is being followed by cardiology Dr. Reginaldo Koenig; pt currently has Holter monitor to the  chest wall place by cardiologist. Pt denies Headache, Chest Pain, SOB, palpitations, abdominal pain, back pain. Pt recently had his Amlodipine increased from 5 mg to 10 mg by Dr. Koenig 2 days ago.    Pressures have improved in ED.  Sent to CDU for 2nd trop/tele monitoring., and to observe for recurrent sx.  Vitals stable and Resting comfortably at this time.

## 2019-05-04 NOTE — ED ADULT NURSE NOTE - OBJECTIVE STATEMENT
Pt a&ox4, ambulatory, in NAD. Pt reports recent discharge - states was admitted due to HTN and was discharged on increased antihypertensive medication dosage. Pt reports following up with cardiology - "normal" echocardiogram and is currently waiting an external cardiac monitor. Pt arrives to ED c/o of gradually increasing headache,  nausea, at 7pm. Pt states subsequently checked BP - approximately 200s/100s BP. Pt denies headache, chest pain, SOB, vision changes. pt endorses nausea at this time. Pt appears comfortable. VS improved as documented. IV placed. Labs drawn and sent to lab. NSR on cardiac monitor.

## 2019-05-04 NOTE — ED CDU PROVIDER SUBSEQUENT DAY NOTE - MEDICAL DECISION MAKING DETAILS
Dizziness/nausea, Eleavted BP-  Ng MRI/MRA , Renal duples from this past week, BP meds recently inc by his caridologist.  WIll monitor on tele , rpt trop , tsh, orthostatics in AM, and opbserve for recurrent sx.  ED team sw his cardiologist, Dr Mar who said he can fu out pt in his office this week ? add Tenormin at [place]. Dizziness/ elevatedd BP- monitor vitals, tele, re-eval

## 2019-05-05 VITALS
OXYGEN SATURATION: 98 % | TEMPERATURE: 98 F | RESPIRATION RATE: 16 BRPM | SYSTOLIC BLOOD PRESSURE: 143 MMHG | DIASTOLIC BLOOD PRESSURE: 75 MMHG | HEART RATE: 73 BPM

## 2019-05-05 LAB
ALBUMIN SERPL ELPH-MCNC: 3.8 G/DL — SIGNIFICANT CHANGE UP (ref 3.3–5)
ALP SERPL-CCNC: 73 U/L — SIGNIFICANT CHANGE UP (ref 40–120)
ALT FLD-CCNC: 12 U/L — SIGNIFICANT CHANGE UP (ref 4–41)
ANION GAP SERPL CALC-SCNC: 10 MMO/L — SIGNIFICANT CHANGE UP (ref 7–14)
AST SERPL-CCNC: 13 U/L — SIGNIFICANT CHANGE UP (ref 4–40)
BASOPHILS # BLD AUTO: 0.04 K/UL — SIGNIFICANT CHANGE UP (ref 0–0.2)
BASOPHILS NFR BLD AUTO: 0.5 % — SIGNIFICANT CHANGE UP (ref 0–2)
BILIRUB SERPL-MCNC: 1.2 MG/DL — SIGNIFICANT CHANGE UP (ref 0.2–1.2)
BUN SERPL-MCNC: 21 MG/DL — SIGNIFICANT CHANGE UP (ref 7–23)
CALCIUM SERPL-MCNC: 9 MG/DL — SIGNIFICANT CHANGE UP (ref 8.4–10.5)
CHLORIDE SERPL-SCNC: 108 MMOL/L — HIGH (ref 98–107)
CO2 SERPL-SCNC: 27 MMOL/L — SIGNIFICANT CHANGE UP (ref 22–31)
CREAT 24H UR-MCNC: 1.4 G/24HR — SIGNIFICANT CHANGE UP (ref 0.8–1.8)
CREAT SERPL-MCNC: 1.48 MG/DL — HIGH (ref 0.5–1.3)
EOSINOPHIL # BLD AUTO: 0.18 K/UL — SIGNIFICANT CHANGE UP (ref 0–0.5)
EOSINOPHIL NFR BLD AUTO: 2.2 % — SIGNIFICANT CHANGE UP (ref 0–6)
GLUCOSE SERPL-MCNC: 106 MG/DL — HIGH (ref 70–99)
HCT VFR BLD CALC: 45 % — SIGNIFICANT CHANGE UP (ref 39–50)
HGB BLD-MCNC: 14.8 G/DL — SIGNIFICANT CHANGE UP (ref 13–17)
IMM GRANULOCYTES NFR BLD AUTO: 0.2 % — SIGNIFICANT CHANGE UP (ref 0–1.5)
LYMPHOCYTES # BLD AUTO: 2.4 K/UL — SIGNIFICANT CHANGE UP (ref 1–3.3)
LYMPHOCYTES # BLD AUTO: 28.7 % — SIGNIFICANT CHANGE UP (ref 13–44)
M PROTEIN 24H MFR UR ELPH: 97 MG/24 HR — SIGNIFICANT CHANGE UP
MCHC RBC-ENTMCNC: 28.8 PG — SIGNIFICANT CHANGE UP (ref 27–34)
MCHC RBC-ENTMCNC: 32.9 % — SIGNIFICANT CHANGE UP (ref 32–36)
MCV RBC AUTO: 87.7 FL — SIGNIFICANT CHANGE UP (ref 80–100)
MONOCYTES # BLD AUTO: 0.62 K/UL — SIGNIFICANT CHANGE UP (ref 0–0.9)
MONOCYTES NFR BLD AUTO: 7.4 % — SIGNIFICANT CHANGE UP (ref 2–14)
NEUTROPHILS # BLD AUTO: 5.1 K/UL — SIGNIFICANT CHANGE UP (ref 1.8–7.4)
NEUTROPHILS NFR BLD AUTO: 61 % — SIGNIFICANT CHANGE UP (ref 43–77)
NRBC # FLD: 0 K/UL — SIGNIFICANT CHANGE UP (ref 0–0)
PLATELET # BLD AUTO: 218 K/UL — SIGNIFICANT CHANGE UP (ref 150–400)
PMV BLD: 10.4 FL — SIGNIFICANT CHANGE UP (ref 7–13)
POTASSIUM SERPL-MCNC: 3.9 MMOL/L — SIGNIFICANT CHANGE UP (ref 3.5–5.3)
POTASSIUM SERPL-SCNC: 3.9 MMOL/L — SIGNIFICANT CHANGE UP (ref 3.5–5.3)
PROT SERPL-MCNC: 6.2 G/DL — SIGNIFICANT CHANGE UP (ref 6–8.3)
RBC # BLD: 5.13 M/UL — SIGNIFICANT CHANGE UP (ref 4.2–5.8)
RBC # FLD: 12.8 % — SIGNIFICANT CHANGE UP (ref 10.3–14.5)
SODIUM SERPL-SCNC: 145 MMOL/L — SIGNIFICANT CHANGE UP (ref 135–145)
SPECIMEN VOL 24H UR: 2420 ML — SIGNIFICANT CHANGE UP
SPECIMEN VOL 24H UR: 2420 ML — SIGNIFICANT CHANGE UP
WBC # BLD: 8.36 K/UL — SIGNIFICANT CHANGE UP (ref 3.8–10.5)
WBC # FLD AUTO: 8.36 K/UL — SIGNIFICANT CHANGE UP (ref 3.8–10.5)

## 2019-05-05 PROCEDURE — 76770 US EXAM ABDO BACK WALL COMP: CPT | Mod: 26

## 2019-05-05 PROCEDURE — 99217: CPT

## 2019-05-05 RX ADMIN — Medication 81 MILLIGRAM(S): at 11:28

## 2019-05-05 NOTE — ED CDU PROVIDER SUBSEQUENT DAY NOTE - MEDICAL DECISION MAKING DETAILS
Dizziness and nausea - resolved.  EKG non-ischemic. Trop 9>9. +Holter monitor - to be evaluated by Dizziness and nausea - resolved.  EKG non-ischemic. Trop 9>9. +Holter monitor - to be evaluated by cardiologist Dr. Covarrubias. Labile BP, concern for pheo, negative MRI and renal duplex, pending 24 Urine specimen collection. will continue to monitor and reassess.

## 2019-05-05 NOTE — ED CDU PROVIDER DISPOSITION NOTE - NSFOLLOWUPINSTRUCTIONS_ED_ALL_ED_FT
Take Ativan 0.5mg as needed for severe anxiety - maximum 3 doses per day. Please continue all home medications as directed. See your regular doctor within 72 hours for follow-up care. Call the Emergency Department from hours 8:30am-2:30pm Monday-Friday to follow up with your test results. Return to ER for new or worsening symptoms.

## 2019-05-05 NOTE — ED CDU PROVIDER SUBSEQUENT DAY NOTE - PMH
CKD (chronic kidney disease)    HTN (hypertension)
CKD (chronic kidney disease)    HTN (hypertension)

## 2019-05-05 NOTE — ED CDU PROVIDER SUBSEQUENT DAY NOTE - PROGRESS NOTE DETAILS
PT NAD, VSS, no acute complaints. PE: cards: RRR, Lungs: CTA, Abdomen: soft, nontender, nondistended. Discussed the results of the labs and imaging with the patient. Pending lab work/urine sample which will result in a few days. Pt given ED number to f.u.

## 2019-05-05 NOTE — ED CDU PROVIDER SUBSEQUENT DAY NOTE - ATTENDING CONTRIBUTION TO CARE
Ignacia: 68 yom with CKD and HTN, on stable dose of meds for years, recently had episode of dizziness attributed to elevated BP but MRI negative, and Dr. Koenig increased Amlodipine to 10mg. Next day pt continued to have elevated BP 190s (new machine) and sxs. Pt placed in CDU for BP monitoring and pheochromocytoma workup, 24 hour urine. Daughter also mentioned that renal us and other labs not complete. On exam, BP here has been very stable, and pt has no sxs. Well appearing, no distress, clear lungs, normal cardiac, abd soft non tender, no edema. gait normal. pulses equal in all ext. Labs and renal us ordered.

## 2019-05-05 NOTE — ED CDU PROVIDER DISPOSITION NOTE - CLINICAL COURSE
Ignacia: 68 yom with CKD and HTN, on stable dose of meds for years, recently had episode of dizziness attributed to elevated BP but MRI negative, and Dr. Koenig increased Amlodipine to 10mg. Next day pt continued to have elevated BP 190s (new machine) and sxs. Pt placed in CDU for BP monitoring and pheochromocytoma workup, 24 hour urine. Daughter also mentioned that renal us and other labs not complete. On exam, BP here has been very stable, and pt has no sxs. Well appearing, no distress, clear lungs, normal cardiac, abd soft non tender, no edema. gait normal. pulses equal in all ext. Labs received and US shows enlarged prostate. Ignacia: 68 yom with CKD and HTN, on stable dose of meds for years, recently had episode of dizziness attributed to elevated BP but MRI negative, and Dr. Koenig increased Amlodipine to 10mg. Next day pt continued to have elevated BP 190s (new machine) and sxs. Pt placed in CDU for BP monitoring and pheochromocytoma workup, 24 hour urine. Daughter also mentioned that renal us and other labs not complete. On exam, BP here has been very stable, and pt has no sxs. Well appearing, no distress, clear lungs, normal cardiac, abd soft non tender, no edema. gait normal. pulses equal in all ext. Labs received and US shows enlarged prostate. Small dose of anxiolytic given after confirming dose as anxiety seems to raise his BP as per daughter.

## 2019-05-05 NOTE — ED CDU PROVIDER SUBSEQUENT DAY NOTE - HISTORY
68 year old male with PMH of CKD and HTN currently in CDU for cardiac monitoring after an episode of dizziness with associated nausea which has now resolved. Pt has been asymptomatic thus far. BP has been controlled; however, due to labile BP, there is a concern for Pheochromocytoma, renal duplex and MRIs negative, pt is being kept overnight for 24 hr urine collection. Also has holter monitor, to be evaluated by cardiologist Dr. Covarrubias in the am. will continue Tele monitoring, and repeat orthostatic vs in am.

## 2019-05-06 LAB — RENIN DIRECT, PLASMA: 4.1 PG/ML — SIGNIFICANT CHANGE UP

## 2019-05-07 LAB — ACE SERPL-CCNC: 30 U/L — SIGNIFICANT CHANGE UP (ref 14–82)

## 2019-05-09 LAB
COLLECT DURATION TIME UR: 24 H — SIGNIFICANT CHANGE UP
DOPAMINE - URINE 24 HOUR: 296 UG/24 HR — SIGNIFICANT CHANGE UP (ref 0–510)
DOPAMINE UR-MCNC: 122 UG/L — SIGNIFICANT CHANGE UP
EPINEPH UR-MCNC: 2 UG/L — SIGNIFICANT CHANGE UP
EPINEPHRINE - URINE, 24 HOUR: 5 UG/24 HR — SIGNIFICANT CHANGE UP (ref 0–20)
METANEPH 24H UR-MRATE: 87 — SIGNIFICANT CHANGE UP
METANEPH UR-MCNC: 24 H — SIGNIFICANT CHANGE UP
METANEPH UR-MCNC: 2425 ML — SIGNIFICANT CHANGE UP
METANEPH UR-MCNC: 87 — SIGNIFICANT CHANGE UP
METANEPHS 24H UR-MRATE: 2425 ML — SIGNIFICANT CHANGE UP
METANEPHS 24H UR-MRATE: 434 — SIGNIFICANT CHANGE UP
METANEPHS UR-MCNC: 434 — SIGNIFICANT CHANGE UP
NOREPINEPH UR-MCNC: 15 UG/L — SIGNIFICANT CHANGE UP
NOREPINEPHRINE - URINE, 24 HOUR: 36 UG/24 HR — SIGNIFICANT CHANGE UP (ref 0–135)
NORMETANEPHRINE 24H UR-MRATE: 347 — SIGNIFICANT CHANGE UP
NORMETANEPHRINE UR-SCNC: 347 — SIGNIFICANT CHANGE UP
RENIN PLAS-CCNC: 0.47 NG/ML/HR — SIGNIFICANT CHANGE UP (ref 0.17–5.38)
SPECIMEN VOL 24H UR: 2425 ML — SIGNIFICANT CHANGE UP
SPECIMEN VOL 24H UR: 434 — SIGNIFICANT CHANGE UP

## 2019-06-05 ENCOUNTER — APPOINTMENT (OUTPATIENT)
Dept: NEUROLOGY | Facility: CLINIC | Age: 69
End: 2019-06-05

## 2019-06-06 LAB
CATECHOLS UR-MCNC: SIGNIFICANT CHANGE UP
CATECHOLS UR-MCNC: SIGNIFICANT CHANGE UP

## 2019-06-25 ENCOUNTER — APPOINTMENT (OUTPATIENT)
Dept: NEUROLOGY | Facility: CLINIC | Age: 69
End: 2019-06-25

## 2021-03-21 NOTE — ED PROVIDER NOTE - GASTROINTESTINAL [+], MLM
NAUSEA Implemented All Universal Safety Interventions:  [place] to call system. Call bell, personal items and telephone within reach. Instruct patient to call for assistance. Room bathroom lighting operational. Non-slip footwear when patient is off stretcher. Physically safe environment: no spills, clutter or unnecessary equipment. Stretcher in lowest position, wheels locked, appropriate side rails in place.

## 2021-07-21 NOTE — ED ADULT NURSE NOTE - CAS TRG GEN SKIN COLOR
[facility] - Outpatient Follow-up Note  Irma Rowland 41 y o  male MRN: [id number]  Encounter: [id number]          ASSESSMENT AND PLAN:      1  Lymphocytic colitis  44-year-old male with past medical history of asthma, hyperlipidemia on atorvastatin who presents for follow-up  He was last seen 02/2021 for diarrhea  Colonoscopy was performed at that time which was unremarkable with random biopsies noting lymphocytic colitis  He was prescribed budesonide 9 mg daily however only started taking this 2-3 weeks ago due to problems obtaining it from pharmacy  He reports his BMs have decreased from 5-6 a day now to 1-2 formed but soft stools  He also tried fiber supplement with minimal relief however would like to try this again  Of note, 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 and has been on Lipitor  Additionally, he is seeing a cardiologist would likely wants to recommend starting daily ASA  -Patient has been on 9mg of budesonide for only 2-3 weeks  Advised patient to continue this dose for a total of 8 weeks and he will then follow up to discuss tapering     -Due to patient's history of hyperlipidemia which appears to be genetic, would recommend he continue taking Lipitor  Additionally, although aspirin is associated with causing lymphocytic colitis, I believe the benefits of starting this therapy are more important than the risk of recurrence of his colitis  -patient can avoid other NSAIDs in the meantime  -patient can continue to take Bentyl twice a day  He denies any significant abdominal cramping, therefore add follow-up can recommend decreasing this to an as-needed basis  -patient recommended to start taking a fiber supplement such as Metamucil, Benefiber, Citrucel 1 tablespoon daily  He can increase this to 2 tablespoons a day  He was recommended to increase water intake with this        Patient will follow-up in 2-3 months   ______________________________________________________________________    SUBJECTIVE:      Sofia Campoverde is a 59-year-old male with past medical history of lymphocytic colitis, asthma, hyperlipidemia who presents for follow-up  He was last seen in the office 02/2021 for diarrhea  Had lab work done including blood count, thyroid function inflammatory markers which was normal   Colonoscopy was performed which was normal however biopsies were notable for lymphocytic colitis  He was recommended to start budesonide 9 mg once a day  Patient reports he was only able to start budesonide around 2-3 weeks ago due to problems with the insurance  He does note his bowel movements have decreased from 5-6 a day to now 1-2 a day  He reports they are formed however soft  He denies any significant abdominal cramping or rectal bleeding  He reports he tried a fiber supplement however does not know how much she took  He also is trying to change around his diet and decrease needs for the last few weeks  Patient reports weight loss a few years ago however it is now stable  Patient was diagnosed with likely a familial lipid disorder and has been on atorvastatin for a while  Due to this, he followed up with a cardiologist who recommend starting aspirin however patient was worried about this due to affects of the stomach and his colitis  Patient with colonoscopy 04/2021 which was normal and repeat recommended at the age of 41  REVIEW OF SYSTEMS IS OTHERWISE NEGATIVE  Historical Information   Past Medical History:   Diagnosis Date    Asthma     Resolved 5/15/2015     Chronic fatigue syndrome     Resolved 5/15/2015     Erectile dysfunction of nonorganic origin     Resolved 5/15/2015     Hyperlipidemia     Lymphocytosis     Symptomatic   Resolved 5/15/2015      Past Surgical History:   Procedure Laterality Date    HERNIA REPAIR      NC REMOVAL OF TONSILS,12+ Y/O N/A 3/1/2017    Procedure: TONSILLECTOMY;  Surgeon: Margarette Homans, MD;  Location: [facility];  Service: ENT    TONSILLECTOMY      WISDOM TOOTH EXTRACTION       Social History   Social History     Substance and Sexual Activity   Alcohol Use Yes    Comment: couple times a week     Social History     Substance and Sexual Activity   Drug Use No     Social History     Tobacco Use   Smoking Status Never Smoker   Smokeless Tobacco Never Used     Family History   Problem Relation Age of Onset    Ulcerative colitis Mother        Meds/Allergies       Current Outpatient Medications:     acetaminophen (TYLENOL) 160 mg/5 mL liquid    Albuterol Sulfate 108 (90 Base) MCG/ACT AEPB    budesonide 9 mg TB24    dicyclomine (BENTYL) 10 mg capsule    hydrocortisone (ANUSOL-HC) 25 mg suppository    Omega-3 Fatty Acids (FISH OIL) 1,000 mg    valACYclovir (VALTREX) 1,000 mg tablet    atorvastatin (LIPITOR) 40 mg tablet    No Known Allergies        Objective     Blood pressure 128/82, height 5' 11" (1 803 m), weight 91 7 kg (202 lb 3 2 oz)  Body mass index is 28 2 kg/m²  PHYSICAL EXAM:      General Appearance:   Alert, cooperative, no distress   HEENT:   Normocephalic, atraumatic, anicteric      Neck:  Supple, symmetrical, trachea midline   Lungs:   Clear to auscultation bilaterally; no rales, rhonchi or wheezing; respirations unlabored    Heart[de-identified]   Regular rate and rhythm; no murmur, rub, or gallop  Abdomen:   Soft, non-tender, non-distended; normal bowel sounds; no masses, no organomegaly    Genitalia:   Deferred    Rectal:   Deferred    Extremities:  No cyanosis, clubbing or edema    Pulses:  2+ and symmetric    Skin:  No jaundice, rashes, or lesions    Lymph nodes:  No palpable cervical lymphadenopathy        Lab Results:   No visits with results within 1 Day(s) from this visit     Latest known visit with results is:   Hospital Outpatient Visit on 04/23/2021   Component Date Value    Case Report 04/23/2021                      Value:Surgical Pathology Report                         Case: [id number]                                   Authorizing Provider:  Ama Zambrano MD            Collected:           04/23/2021 1437              Ordering Location:     [facility]        Received:            04/23/2021 Pr-2 Chew By Pass Endoscopy                                                           Pathologist:           Bob Ross MD                                                                 Specimen:    Colon, Cold BX Random Colon                                                                Final Diagnosis 04/23/2021                      Value: This result contains rich text formatting which cannot be displayed here   Note 04/23/2021                      Value: This result contains rich text formatting which cannot be displayed here   Additional Information 04/23/2021                      Value: This result contains rich text formatting which cannot be displayed here  Stacialfredito Allen Description 04/23/2021                      Value: This result contains rich text formatting which cannot be displayed here   Clinical Information 04/23/2021                      Value:R/O Micro Colitis         Radiology Results:   No results found  Normal for race

## 2022-10-06 ENCOUNTER — NON-APPOINTMENT (OUTPATIENT)
Age: 72
End: 2022-10-06

## 2022-10-06 DIAGNOSIS — Z78.9 OTHER SPECIFIED HEALTH STATUS: ICD-10-CM

## 2022-10-06 DIAGNOSIS — Z83.3 FAMILY HISTORY OF DIABETES MELLITUS: ICD-10-CM

## 2022-10-06 DIAGNOSIS — Z86.010 PERSONAL HISTORY OF COLONIC POLYPS: ICD-10-CM

## 2022-10-06 DIAGNOSIS — R14.0 ABDOMINAL DISTENSION (GASEOUS): ICD-10-CM

## 2022-10-06 DIAGNOSIS — Z87.19 PERSONAL HISTORY OF OTHER DISEASES OF THE DIGESTIVE SYSTEM: ICD-10-CM

## 2022-10-06 DIAGNOSIS — K44.9 DIAPHRAGMATIC HERNIA W/OUT OBSTRUCTION OR GANGRENE: ICD-10-CM

## 2022-10-06 DIAGNOSIS — K57.90 DIVERTICULOSIS OF INTESTINE, PART UNSPECIFIED, W/OUT PERFORATION OR ABSCESS W/OUT BLEEDING: ICD-10-CM

## 2022-10-06 RX ORDER — OMEPRAZOLE 40 MG/1
40 CAPSULE, DELAYED RELEASE ORAL
Refills: 0 | Status: ACTIVE | COMMUNITY

## 2022-10-14 ENCOUNTER — APPOINTMENT (OUTPATIENT)
Dept: CARDIOLOGY | Facility: CLINIC | Age: 72
End: 2022-10-14

## 2022-10-25 ENCOUNTER — APPOINTMENT (OUTPATIENT)
Dept: CARDIOLOGY | Facility: CLINIC | Age: 72
End: 2022-10-25

## 2022-10-25 VITALS
HEIGHT: 66 IN | TEMPERATURE: 97.2 F | BODY MASS INDEX: 27.32 KG/M2 | SYSTOLIC BLOOD PRESSURE: 122 MMHG | DIASTOLIC BLOOD PRESSURE: 82 MMHG | WEIGHT: 170 LBS

## 2022-10-25 PROCEDURE — 99213 OFFICE O/P EST LOW 20 MIN: CPT

## 2022-10-25 NOTE — REASON FOR VISIT
[FreeTextEntry1] : 71 years old male comes to the office for evaluation of hypertensions.  Patient has hypertension dyslipidemia.  Patient recently had flu.  Patient has had mild cough off-and-on.  No chills and fever

## 2022-10-25 NOTE — ASSESSMENT
[FreeTextEntry1] : [age] years old male with hypertension dyslipidemia.  Patient blood pressure seems to be well controlled.  Because of cough patient was advised for a chest x-ray.  Patient will be seen in the office in about 2 months.

## 2022-11-23 ENCOUNTER — NON-APPOINTMENT (OUTPATIENT)
Age: 72
End: 2022-11-23

## 2022-11-23 DIAGNOSIS — Z87.898 PERSONAL HISTORY OF OTHER SPECIFIED CONDITIONS: ICD-10-CM

## 2022-11-23 DIAGNOSIS — Z87.09 PERSONAL HISTORY OF OTHER DISEASES OF THE RESPIRATORY SYSTEM: ICD-10-CM

## 2022-12-07 ENCOUNTER — NON-APPOINTMENT (OUTPATIENT)
Age: 72
End: 2022-12-07

## 2022-12-07 DIAGNOSIS — K22.10 ULCER OF ESOPHAGUS W/OUT BLEEDING: ICD-10-CM

## 2022-12-27 ENCOUNTER — APPOINTMENT (OUTPATIENT)
Dept: CARDIOLOGY | Facility: CLINIC | Age: 72
End: 2022-12-27

## 2022-12-27 VITALS
WEIGHT: 173 LBS | OXYGEN SATURATION: 98 % | HEIGHT: 66 IN | HEART RATE: 58 BPM | TEMPERATURE: 97.8 F | BODY MASS INDEX: 27.8 KG/M2

## 2022-12-27 VITALS — SYSTOLIC BLOOD PRESSURE: 123 MMHG | DIASTOLIC BLOOD PRESSURE: 79 MMHG

## 2022-12-27 PROCEDURE — 99213 OFFICE O/P EST LOW 20 MIN: CPT

## 2022-12-27 NOTE — ASSESSMENT
[FreeTextEntry1] : 72 years old male with hypertension dyslipidemia comes to the office for routine checkup.  Patient's blood pressure seems to be well controlled. patient will continue present medication no changes are made

## 2022-12-27 NOTE — REASON FOR VISIT
[Hyperlipidemia] : hyperlipidemia [Hypertension] : hypertension [FreeTextEntry1] : 72 years old male with hypertension and dyslipidemia comes to the office for a routine follow-up

## 2023-03-28 ENCOUNTER — APPOINTMENT (OUTPATIENT)
Dept: CARDIOLOGY | Facility: CLINIC | Age: 73
End: 2023-03-28
Payer: COMMERCIAL

## 2023-03-28 VITALS
OXYGEN SATURATION: 98 % | BODY MASS INDEX: 27.8 KG/M2 | HEART RATE: 61 BPM | DIASTOLIC BLOOD PRESSURE: 82 MMHG | SYSTOLIC BLOOD PRESSURE: 120 MMHG | WEIGHT: 173 LBS | HEIGHT: 66 IN | TEMPERATURE: 97.9 F

## 2023-03-28 PROCEDURE — 99213 OFFICE O/P EST LOW 20 MIN: CPT

## 2023-03-28 NOTE — ASSESSMENT
[FreeTextEntry1] : Patient's medications reviewed patient will continue present medication.  Patient was advised for complete blood work done in near future

## 2023-03-28 NOTE — REASON FOR VISIT
[Hyperlipidemia] : hyperlipidemia [Hypertension] : hypertension [FreeTextEntry1] : 72 years old male with hypertension dyslipidemia mild renal insufficiency comes to the office for routine follow up

## 2023-05-04 ENCOUNTER — APPOINTMENT (OUTPATIENT)
Dept: CARDIOLOGY | Facility: CLINIC | Age: 73
End: 2023-05-04

## 2023-06-27 ENCOUNTER — APPOINTMENT (OUTPATIENT)
Dept: CARDIOLOGY | Facility: CLINIC | Age: 73
End: 2023-06-27
Payer: COMMERCIAL

## 2023-06-27 VITALS
WEIGHT: 171 LBS | SYSTOLIC BLOOD PRESSURE: 130 MMHG | BODY MASS INDEX: 27.48 KG/M2 | TEMPERATURE: 97.2 F | DIASTOLIC BLOOD PRESSURE: 80 MMHG | HEART RATE: 59 BPM | HEIGHT: 66 IN | OXYGEN SATURATION: 98 %

## 2023-06-27 PROCEDURE — 99213 OFFICE O/P EST LOW 20 MIN: CPT

## 2023-06-27 RX ORDER — ATORVASTATIN CALCIUM 10 MG/1
10 TABLET, FILM COATED ORAL DAILY
Refills: 0 | Status: DISCONTINUED | COMMUNITY
End: 2023-06-27

## 2023-06-27 RX ORDER — DIGESTIVE ENZYMES (PLANT) 182 MG
CAPSULE ORAL
Refills: 0 | Status: DISCONTINUED | COMMUNITY
End: 2023-06-27

## 2023-06-27 RX ORDER — ASPIRIN 81 MG/1
81 TABLET, COATED ORAL
Refills: 0 | Status: DISCONTINUED | COMMUNITY
End: 2023-06-27

## 2023-06-27 RX ORDER — PANCRELIPASE LIPASE, PANCRELIPASE PROTEASE, PANCRELIPASE AMYLASE 252600; 60000; 189600 [USP'U]/1; [USP'U]/1; [USP'U]/1
CAPSULE, DELAYED RELEASE ORAL
Refills: 0 | Status: DISCONTINUED | COMMUNITY
End: 2023-06-27

## 2023-06-27 NOTE — ASSESSMENT
[FreeTextEntry1] : Patient's medications reviewed.  Patient continue present medication patient was advised for BMP

## 2023-06-27 NOTE — REASON FOR VISIT
[Hypertension] : hypertension [FreeTextEntry1] : 72 years old male with hypertension  and mild renal insufficiency comes to the office for routine follow-up

## 2023-09-20 ENCOUNTER — RX RENEWAL (OUTPATIENT)
Age: 73
End: 2023-09-20

## 2023-10-09 ENCOUNTER — APPOINTMENT (OUTPATIENT)
Dept: CARDIOLOGY | Facility: CLINIC | Age: 73
End: 2023-10-09
Payer: COMMERCIAL

## 2023-10-09 VITALS
HEART RATE: 57 BPM | TEMPERATURE: 98.4 F | BODY MASS INDEX: 27.8 KG/M2 | OXYGEN SATURATION: 98 % | HEIGHT: 66 IN | SYSTOLIC BLOOD PRESSURE: 115 MMHG | WEIGHT: 173 LBS | DIASTOLIC BLOOD PRESSURE: 78 MMHG

## 2023-10-09 PROCEDURE — 99213 OFFICE O/P EST LOW 20 MIN: CPT

## 2023-12-26 ENCOUNTER — RX RENEWAL (OUTPATIENT)
Age: 73
End: 2023-12-26

## 2023-12-28 ENCOUNTER — APPOINTMENT (OUTPATIENT)
Dept: CARDIOLOGY | Facility: CLINIC | Age: 73
End: 2023-12-28
Payer: COMMERCIAL

## 2023-12-28 VITALS
OXYGEN SATURATION: 96 % | TEMPERATURE: 99 F | HEART RATE: 62 BPM | RESPIRATION RATE: 16 BRPM | DIASTOLIC BLOOD PRESSURE: 86 MMHG | SYSTOLIC BLOOD PRESSURE: 139 MMHG | BODY MASS INDEX: 26.2 KG/M2 | WEIGHT: 163 LBS | HEIGHT: 66 IN

## 2023-12-28 DIAGNOSIS — R73.9 HYPERGLYCEMIA, UNSPECIFIED: ICD-10-CM

## 2023-12-28 DIAGNOSIS — Z00.00 ENCOUNTER FOR GENERAL ADULT MEDICAL EXAMINATION W/OUT ABNORMAL FINDINGS: ICD-10-CM

## 2023-12-28 DIAGNOSIS — N18.9 CHRONIC KIDNEY DISEASE, UNSPECIFIED: ICD-10-CM

## 2023-12-28 PROCEDURE — 99213 OFFICE O/P EST LOW 20 MIN: CPT

## 2023-12-28 NOTE — ASSESSMENT
[FreeTextEntry1] : Patient's medications reviewed patient will continue present medication no changes are made.  Patient was advised for blood work

## 2023-12-28 NOTE — REASON FOR VISIT
[Hyperlipidemia] : hyperlipidemia [Hypertension] : hypertension [FreeTextEntry1] : 73 years old male with hypertension dyslipidemia and mild renal insufficiency comes to the office for routine follow-up.  Patient being followed by nephrologist for mild renal insufficiency.

## 2024-04-01 ENCOUNTER — RX RENEWAL (OUTPATIENT)
Age: 74
End: 2024-04-01

## 2024-04-01 RX ORDER — AMLODIPINE BESYLATE 10 MG/1
10 TABLET ORAL DAILY
Qty: 90 | Refills: 0 | Status: ACTIVE | COMMUNITY
Start: 2023-12-26 | End: 1900-01-01

## 2024-04-08 ENCOUNTER — NON-APPOINTMENT (OUTPATIENT)
Age: 74
End: 2024-04-08

## 2024-04-09 ENCOUNTER — APPOINTMENT (OUTPATIENT)
Dept: CARDIOLOGY | Facility: CLINIC | Age: 74
End: 2024-04-09
Payer: COMMERCIAL

## 2024-04-09 VITALS
SYSTOLIC BLOOD PRESSURE: 146 MMHG | DIASTOLIC BLOOD PRESSURE: 89 MMHG | HEART RATE: 68 BPM | RESPIRATION RATE: 16 BRPM | BODY MASS INDEX: 27.32 KG/M2 | OXYGEN SATURATION: 96 % | TEMPERATURE: 98.8 F | HEIGHT: 66 IN | WEIGHT: 170 LBS

## 2024-04-09 DIAGNOSIS — E78.5 HYPERLIPIDEMIA, UNSPECIFIED: ICD-10-CM

## 2024-04-09 DIAGNOSIS — R05.9 COUGH, UNSPECIFIED: ICD-10-CM

## 2024-04-09 DIAGNOSIS — I10 ESSENTIAL (PRIMARY) HYPERTENSION: ICD-10-CM

## 2024-04-09 PROCEDURE — 99213 OFFICE O/P EST LOW 20 MIN: CPT

## 2024-04-09 RX ORDER — AZITHROMYCIN 250 MG/1
250 TABLET, FILM COATED ORAL
Qty: 1 | Refills: 0 | Status: ACTIVE | COMMUNITY
Start: 2024-04-09 | End: 1900-01-01

## 2024-04-09 NOTE — HISTORY OF PRESENT ILLNESS
[FreeTextEntry8] : 73 years old male comes to the office with complaints of sore throat and cough for the last 3 days.  Denies any chills and fever

## 2024-07-05 ENCOUNTER — RX RENEWAL (OUTPATIENT)
Age: 74
End: 2024-07-05

## 2024-08-15 ENCOUNTER — NON-APPOINTMENT (OUTPATIENT)
Age: 74
End: 2024-08-15

## 2024-08-16 ENCOUNTER — NON-APPOINTMENT (OUTPATIENT)
Age: 74
End: 2024-08-16

## 2024-08-16 ENCOUNTER — APPOINTMENT (OUTPATIENT)
Dept: CARDIOLOGY | Facility: CLINIC | Age: 74
End: 2024-08-16
Payer: COMMERCIAL

## 2024-08-16 VITALS
TEMPERATURE: 98.9 F | BODY MASS INDEX: 28.61 KG/M2 | SYSTOLIC BLOOD PRESSURE: 130 MMHG | OXYGEN SATURATION: 97 % | DIASTOLIC BLOOD PRESSURE: 80 MMHG | WEIGHT: 178 LBS | HEIGHT: 66 IN | HEART RATE: 60 BPM

## 2024-08-16 DIAGNOSIS — R73.9 HYPERGLYCEMIA, UNSPECIFIED: ICD-10-CM

## 2024-08-16 DIAGNOSIS — N18.9 CHRONIC KIDNEY DISEASE, UNSPECIFIED: ICD-10-CM

## 2024-08-16 DIAGNOSIS — I10 ESSENTIAL (PRIMARY) HYPERTENSION: ICD-10-CM

## 2024-08-16 DIAGNOSIS — E78.5 HYPERLIPIDEMIA, UNSPECIFIED: ICD-10-CM

## 2024-08-16 DIAGNOSIS — R00.2 PALPITATIONS: ICD-10-CM

## 2024-08-16 PROCEDURE — 93000 ELECTROCARDIOGRAM COMPLETE: CPT

## 2024-08-16 PROCEDURE — 99397 PER PM REEVAL EST PAT 65+ YR: CPT

## 2024-08-16 NOTE — HISTORY OF PRESENT ILLNESS
[de-identified] : 73 years old male with hypertension, dyslipidemia, and mild renal insufficiency comes to the office for annual wellness physical

## 2024-08-16 NOTE — PLAN
[FreeTextEntry1] : Patient medications reviewed patient continue present medication.  Patient was advised for complete blood work

## 2024-10-01 NOTE — ED CDU PROVIDER SUBSEQUENT DAY NOTE - NEUROLOGICAL LEVEL OF CONSCIOUSNESS
Include a protein supplement of at least 30g of protein in ADDITION to regular meals.     Dressing Change: Remove dressing and shower on dressing change days. Take a sponge bath in between dressing change days.     Wash hands with soap and water prior to performing dressing change.  Remove old dressing and throw away in the garbage.  Gently cleanse the wound with soap and water and pat dry.  If able to tolerate you can take a clean wash cloth and lightly scrub wound to loosen debris.  When cleansing the wound utilize a gentle soap like dove, ivory or baby soap.  Do not use antibacterial soaps.  Cut a piece of Medihoney to cover the wound  Apply an outer dressing like gauze and tape to cover the wound.  Wash hands once dressing change completed.  Dressing should be changed 3x a week and as needed i.e. dressing is saturated or falling off.   
alert/follows commands

## 2024-10-07 ENCOUNTER — RX RENEWAL (OUTPATIENT)
Age: 74
End: 2024-10-07

## 2024-10-11 ENCOUNTER — RX RENEWAL (OUTPATIENT)
Age: 74
End: 2024-10-11

## 2024-11-19 NOTE — ED CDU PROVIDER INITIAL DAY NOTE - OBJECTIVE STATEMENT
ED Provider Note: 68M w/ hx of HTN, CKD presents as a code stroke d/t intermittent dizziness, latest starting approx 1 hr ago. The patient endorses intermittent dizziness for approx one week, was on meclizine via PMD which slightly improved symptoms. Dizziness was worsened by heavy lifting and bending over. Symptoms got better for a few days and then worsened after bending over and standing up today. Denies chest pain, sob, ha, blurry vision.  Also endorses general weakness.  NO fever/chills. Pt's daughter, a urology resident, is at bedside.  CDU Initial Day Note: ALEJANDRO Scruggs, agree w/ above, pt w/ dizziness, will obtain MR head/neck to r/o CVA. Detail Level: Zone Initiate Treatment: .\\nclobetasol 0.05 % topical cream: Apply topically to affected areas on hands Mon-Fri, take the weekends off. Repeat as needed for rashes. Render In Strict Bullet Format?: No Initiate Treatment: .\\nMODERN ALCHEMIST 5 FU/CALC: APPLY TO FACE BID FOR 5 TO 7 DAYS 68M w/ hx of HTN, CKD presents as a code stroke d/t intermittent dizziness, latest starting approx 1 hr ago. The patient endorses intermittent dizziness for approx one week, was on meclizine via PMD which slightly improved symptoms. Dizziness was worsened by heavy lifting and bending over. Symptoms got better for a few days and then worsened after bending over and standing up today. Denies chest pain, sob, ha, blurry vision.  Also endorses general weakness.  NO fever/chills. Pt's daughter, a urology resident, is at bedside.  Pt sent to CDU d/t concern for Sx related to subacute CVA vs HTN.  Also, CTA deferred d/t acute on chronic renal insufficiency. MRI/MRA pending - r/o CVA, vert/carotid dissection (pt c neck pain). Neuro f/u.     CDU Initial Day Note: ALEJANDRO Scruggs, agree w/ above, pt w/ dizziness, will obtain MR head/neck to r/o CVA.

## 2024-12-12 ENCOUNTER — APPOINTMENT (OUTPATIENT)
Dept: CARDIOLOGY | Facility: CLINIC | Age: 74
End: 2024-12-12
Payer: COMMERCIAL

## 2024-12-12 VITALS
OXYGEN SATURATION: 97 % | DIASTOLIC BLOOD PRESSURE: 76 MMHG | HEART RATE: 64 BPM | TEMPERATURE: 97.3 F | SYSTOLIC BLOOD PRESSURE: 123 MMHG | BODY MASS INDEX: 27.97 KG/M2 | HEIGHT: 66 IN | WEIGHT: 174 LBS

## 2024-12-12 DIAGNOSIS — N18.9 CHRONIC KIDNEY DISEASE, UNSPECIFIED: ICD-10-CM

## 2024-12-12 DIAGNOSIS — I10 ESSENTIAL (PRIMARY) HYPERTENSION: ICD-10-CM

## 2024-12-12 DIAGNOSIS — R73.9 HYPERGLYCEMIA, UNSPECIFIED: ICD-10-CM

## 2024-12-12 PROCEDURE — 99213 OFFICE O/P EST LOW 20 MIN: CPT

## 2025-02-27 ENCOUNTER — APPOINTMENT (OUTPATIENT)
Dept: CARDIOLOGY | Facility: CLINIC | Age: 75
End: 2025-02-27
Payer: COMMERCIAL

## 2025-02-27 VITALS
BODY MASS INDEX: 28.12 KG/M2 | OXYGEN SATURATION: 98 % | WEIGHT: 175 LBS | DIASTOLIC BLOOD PRESSURE: 72 MMHG | TEMPERATURE: 98.2 F | SYSTOLIC BLOOD PRESSURE: 136 MMHG | HEIGHT: 66 IN | HEART RATE: 66 BPM

## 2025-02-27 DIAGNOSIS — E78.5 HYPERLIPIDEMIA, UNSPECIFIED: ICD-10-CM

## 2025-02-27 DIAGNOSIS — I10 ESSENTIAL (PRIMARY) HYPERTENSION: ICD-10-CM

## 2025-02-27 DIAGNOSIS — M25.561 PAIN IN RIGHT KNEE: ICD-10-CM

## 2025-02-27 PROCEDURE — 99213 OFFICE O/P EST LOW 20 MIN: CPT

## 2025-02-27 RX ORDER — NAPROXEN 500 MG/1
500 TABLET ORAL
Qty: 20 | Refills: 1 | Status: ACTIVE | COMMUNITY
Start: 2025-02-27 | End: 1900-01-01

## 2025-03-31 ENCOUNTER — APPOINTMENT (OUTPATIENT)
Dept: CARDIOLOGY | Facility: CLINIC | Age: 75
End: 2025-03-31
Payer: COMMERCIAL

## 2025-03-31 ENCOUNTER — NON-APPOINTMENT (OUTPATIENT)
Age: 75
End: 2025-03-31

## 2025-03-31 VITALS
WEIGHT: 175 LBS | OXYGEN SATURATION: 98 % | HEIGHT: 66 IN | HEART RATE: 64 BPM | BODY MASS INDEX: 28.12 KG/M2 | TEMPERATURE: 97.5 F | DIASTOLIC BLOOD PRESSURE: 78 MMHG | SYSTOLIC BLOOD PRESSURE: 144 MMHG

## 2025-03-31 DIAGNOSIS — E78.5 HYPERLIPIDEMIA, UNSPECIFIED: ICD-10-CM

## 2025-03-31 DIAGNOSIS — I10 ESSENTIAL (PRIMARY) HYPERTENSION: ICD-10-CM

## 2025-03-31 DIAGNOSIS — H81.09 MENIERE'S DISEASE, UNSPECIFIED EAR: ICD-10-CM

## 2025-03-31 PROCEDURE — 99213 OFFICE O/P EST LOW 20 MIN: CPT

## 2025-03-31 RX ORDER — MECLIZINE HYDROCHLORIDE 12.5 MG/1
12.5 TABLET ORAL
Qty: 7 | Refills: 0 | Status: ACTIVE | COMMUNITY
Start: 2025-03-31 | End: 1900-01-01

## 2025-06-30 ENCOUNTER — APPOINTMENT (OUTPATIENT)
Dept: CARDIOLOGY | Facility: CLINIC | Age: 75
End: 2025-06-30

## 2025-08-26 ENCOUNTER — APPOINTMENT (OUTPATIENT)
Dept: CARDIOLOGY | Facility: CLINIC | Age: 75
End: 2025-08-26
Payer: COMMERCIAL

## 2025-08-26 VITALS
OXYGEN SATURATION: 96 % | SYSTOLIC BLOOD PRESSURE: 137 MMHG | DIASTOLIC BLOOD PRESSURE: 82 MMHG | HEIGHT: 66 IN | WEIGHT: 175 LBS | BODY MASS INDEX: 28.12 KG/M2 | HEART RATE: 61 BPM

## 2025-08-26 DIAGNOSIS — N52.9 MALE ERECTILE DYSFUNCTION, UNSPECIFIED: ICD-10-CM

## 2025-08-26 DIAGNOSIS — E78.5 HYPERLIPIDEMIA, UNSPECIFIED: ICD-10-CM

## 2025-08-26 DIAGNOSIS — L20.9 ATOPIC DERMATITIS, UNSPECIFIED: ICD-10-CM

## 2025-08-26 DIAGNOSIS — I10 ESSENTIAL (PRIMARY) HYPERTENSION: ICD-10-CM

## 2025-08-26 PROCEDURE — 99213 OFFICE O/P EST LOW 20 MIN: CPT

## 2025-08-26 RX ORDER — TADALAFIL 10 MG/1
10 TABLET, FILM COATED ORAL
Qty: 10 | Refills: 2 | Status: ACTIVE | COMMUNITY
Start: 2025-08-26 | End: 1900-01-01

## 2025-08-26 RX ORDER — HYDROCORTISONE 25 MG/G
2.5 CREAM TOPICAL 3 TIMES DAILY
Qty: 1 | Refills: 2 | Status: ACTIVE | COMMUNITY
Start: 2025-08-26 | End: 1900-01-01
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
## 2025-09-11 DIAGNOSIS — S30.860A INSECT BITE (NONVENOMOUS) OF LOWER BACK AND PELVIS, INITIAL ENCOUNTER: ICD-10-CM
